# Patient Record
Sex: MALE | Race: WHITE | NOT HISPANIC OR LATINO | Employment: OTHER | URBAN - METROPOLITAN AREA
[De-identification: names, ages, dates, MRNs, and addresses within clinical notes are randomized per-mention and may not be internally consistent; named-entity substitution may affect disease eponyms.]

---

## 2021-08-26 ENCOUNTER — MEDICAL CORRESPONDENCE (OUTPATIENT)
Dept: HEALTH INFORMATION MANAGEMENT | Facility: CLINIC | Age: 66
End: 2021-08-26
Payer: MEDICARE

## 2021-10-23 DIAGNOSIS — Z11.59 ENCOUNTER FOR SCREENING FOR OTHER VIRAL DISEASES: ICD-10-CM

## 2021-11-10 RX ORDER — TRANEXAMIC ACID 650 MG/1
1950 TABLET ORAL ONCE
Status: CANCELLED | OUTPATIENT
Start: 2021-11-10 | End: 2021-11-10

## 2021-11-10 RX ORDER — CELECOXIB 200 MG/1
400 CAPSULE ORAL ONCE
Status: CANCELLED | OUTPATIENT
Start: 2021-11-10 | End: 2021-11-10

## 2021-11-10 RX ORDER — ACETAMINOPHEN 325 MG/1
975 TABLET ORAL ONCE
Status: CANCELLED | OUTPATIENT
Start: 2021-11-10 | End: 2021-11-10

## 2021-11-10 RX ORDER — CEFAZOLIN SODIUM IN 0.9 % NACL 3 G/100 ML
3 INTRAVENOUS SOLUTION, PIGGYBACK (ML) INTRAVENOUS
Status: CANCELLED | OUTPATIENT
Start: 2021-11-10

## 2021-11-10 RX ORDER — CEFAZOLIN SODIUM IN 0.9 % NACL 3 G/100 ML
3 INTRAVENOUS SOLUTION, PIGGYBACK (ML) INTRAVENOUS SEE ADMIN INSTRUCTIONS
Status: CANCELLED | OUTPATIENT
Start: 2021-11-10

## 2021-12-24 ENCOUNTER — TRANSFERRED RECORDS (OUTPATIENT)
Dept: MULTI SPECIALTY CLINIC | Facility: CLINIC | Age: 66
End: 2021-12-24
Payer: MEDICARE

## 2021-12-24 LAB
CREATININE (EXTERNAL): 0.98 MG/DL (ref 0.7–1.3)
GFR ESTIMATED (EXTERNAL): >60 MLS/MIN
GLUCOSE (EXTERNAL): 111 MG/DL (ref 74–100)
HBA1C MFR BLD: 5.9 %
POTASSIUM (EXTERNAL): 4.5 MMOL/L (ref 3.5–5.1)

## 2021-12-29 RX ORDER — DIPHENHYDRAMINE HCL 25 MG
25 TABLET ORAL DAILY PRN
COMMUNITY

## 2021-12-29 RX ORDER — HYDROCODONE BITARTRATE AND ACETAMINOPHEN 7.5; 325 MG/1; MG/1
1-2 TABLET ORAL DAILY PRN
Status: ON HOLD | COMMUNITY
End: 2021-12-30

## 2021-12-29 RX ORDER — SILVER NITRATE 0.1 G/10G
LIQUID TOPICAL DAILY PRN
COMMUNITY

## 2021-12-29 NOTE — PROGRESS NOTES
PTA medications updated by Medication Scribe prior to surgery via phone call with patient (last doses completed by Nurse)     Medication history sources: H&P and (CAREGIVER)  In the past week, patient estimated taking medication this percent of the time: Greater than 90%  Adherence assessment: N/A Not Observed    Significant changes made to the medication list:  None      Additional medication history information:   None    Medication reconciliation completed by provider prior to medication history? No    Time spent in this activity: 35 MINUTES    The information provided in this note is only as accurate as the sources available at the time of update(s)      Prior to Admission medications    Medication Sig Last Dose Taking? Auth Provider   CELLCEPT 500 MG PO TABLET Take 1 tablet (500 mg) by mouth 2 times daily Through 4/14. Starting 4/15, take 2 tabs (1000mg) in the morning and 1 tab (500mg) in the evening for 7 days. Starting 4/22, take 2 tabs (1000mg) in the morning and 2 tabs (1000mg) in the evening.  Patient taking differently: Take 1,500 mg by mouth 2 times daily (500MG X 3 = 1,500MG)  at PM Yes Elida Maynard MD   diphenhydrAMINE (BENADRYL) 25 MG tablet Take 25 mg by mouth daily as needed for allergies  at PRN Yes Reported, Patient   entecavir (BARACLUDE) 0.5 MG tablet Take 1 tablet (0.5 mg) by mouth daily  at AM Yes Elida Maynard MD   FLUoxetine (PROZAC) 20 MG capsule Take 20 mg by mouth daily  at AM Yes Reported, Patient   HYDROcodone-acetaminophen (NORCO) 7.5-325 MG per tablet Take 1-2 tablets by mouth daily as needed for severe pain  at PRN Yes Reported, Patient   silver nitrate 10 % SOLN solution Apply topically daily as needed (BULLOUS PEMPHIOGOID)  at PRN Yes Reported, Patient   blood glucose monitoring (ACCU-CHEK JAY) test strip Use to test blood sugars 4 times daily or as directed.   Elida Maynard MD   blood glucose monitoring (ACCU-CHEK MULTICLIX) lancets Use to test  blood sugar 4 times daily or as directed.   Elida Maynard MD

## 2021-12-30 ENCOUNTER — ANESTHESIA EVENT (OUTPATIENT)
Dept: SURGERY | Facility: CLINIC | Age: 66
End: 2021-12-30
Payer: MEDICARE

## 2021-12-30 ENCOUNTER — APPOINTMENT (OUTPATIENT)
Dept: PHYSICAL THERAPY | Facility: CLINIC | Age: 66
End: 2021-12-30
Attending: ORTHOPAEDIC SURGERY
Payer: MEDICARE

## 2021-12-30 ENCOUNTER — HOSPITAL ENCOUNTER (OUTPATIENT)
Facility: CLINIC | Age: 66
Discharge: HOME | End: 2021-12-31
Attending: ORTHOPAEDIC SURGERY | Admitting: ORTHOPAEDIC SURGERY
Payer: MEDICARE

## 2021-12-30 ENCOUNTER — APPOINTMENT (OUTPATIENT)
Dept: GENERAL RADIOLOGY | Facility: CLINIC | Age: 66
End: 2021-12-30
Attending: PHYSICIAN ASSISTANT
Payer: MEDICARE

## 2021-12-30 ENCOUNTER — ANESTHESIA (OUTPATIENT)
Dept: SURGERY | Facility: CLINIC | Age: 66
End: 2021-12-30
Payer: MEDICARE

## 2021-12-30 DIAGNOSIS — Z98.890 POST-OPERATIVE STATE: Primary | ICD-10-CM

## 2021-12-30 PROBLEM — Z96.659 S/P TOTAL KNEE ARTHROPLASTY: Status: ACTIVE | Noted: 2021-12-30

## 2021-12-30 LAB
FASTING STATUS PATIENT QL REPORTED: YES
GLUCOSE BLD-MCNC: 123 MG/DL (ref 70–99)
GLUCOSE BLDC GLUCOMTR-MCNC: 129 MG/DL (ref 70–99)
GLUCOSE BLDC GLUCOMTR-MCNC: 98 MG/DL (ref 70–99)
POTASSIUM BLD-SCNC: 4.1 MMOL/L (ref 3.4–5.3)

## 2021-12-30 PROCEDURE — 250N000011 HC RX IP 250 OP 636: Performed by: PHYSICIAN ASSISTANT

## 2021-12-30 PROCEDURE — 97110 THERAPEUTIC EXERCISES: CPT | Mod: GP

## 2021-12-30 PROCEDURE — 272N000001 HC OR GENERAL SUPPLY STERILE: Performed by: ORTHOPAEDIC SURGERY

## 2021-12-30 PROCEDURE — 250N000011 HC RX IP 250 OP 636: Performed by: SURGERY

## 2021-12-30 PROCEDURE — 258N000001 HC RX 258: Performed by: ORTHOPAEDIC SURGERY

## 2021-12-30 PROCEDURE — 82962 GLUCOSE BLOOD TEST: CPT

## 2021-12-30 PROCEDURE — 710N000009 HC RECOVERY PHASE 1, LEVEL 1, PER MIN: Performed by: ORTHOPAEDIC SURGERY

## 2021-12-30 PROCEDURE — 99207 PR CDG-CODE CATEGORY CHANGED: CPT | Performed by: PHYSICIAN ASSISTANT

## 2021-12-30 PROCEDURE — 258N000003 HC RX IP 258 OP 636: Performed by: PHYSICIAN ASSISTANT

## 2021-12-30 PROCEDURE — 97116 GAIT TRAINING THERAPY: CPT | Mod: GP

## 2021-12-30 PROCEDURE — 82947 ASSAY GLUCOSE BLOOD QUANT: CPT | Performed by: ANESTHESIOLOGY

## 2021-12-30 PROCEDURE — 250N000013 HC RX MED GY IP 250 OP 250 PS 637: Performed by: PHYSICIAN ASSISTANT

## 2021-12-30 PROCEDURE — 250N000011 HC RX IP 250 OP 636: Performed by: NURSE ANESTHETIST, CERTIFIED REGISTERED

## 2021-12-30 PROCEDURE — 36415 COLL VENOUS BLD VENIPUNCTURE: CPT | Performed by: ANESTHESIOLOGY

## 2021-12-30 PROCEDURE — 999N000141 HC STATISTIC PRE-PROCEDURE NURSING ASSESSMENT: Performed by: ORTHOPAEDIC SURGERY

## 2021-12-30 PROCEDURE — 250N000009 HC RX 250: Performed by: ORTHOPAEDIC SURGERY

## 2021-12-30 PROCEDURE — 999N000063 XR KNEE PORT RIGHT 1/2 VIEWS: Mod: RT

## 2021-12-30 PROCEDURE — 278N000051 HC OR IMPLANT GENERAL: Performed by: ORTHOPAEDIC SURGERY

## 2021-12-30 PROCEDURE — 250N000009 HC RX 250: Performed by: ANESTHESIOLOGY

## 2021-12-30 PROCEDURE — 370N000017 HC ANESTHESIA TECHNICAL FEE, PER MIN: Performed by: ORTHOPAEDIC SURGERY

## 2021-12-30 PROCEDURE — C1776 JOINT DEVICE (IMPLANTABLE): HCPCS | Performed by: ORTHOPAEDIC SURGERY

## 2021-12-30 PROCEDURE — 360N000077 HC SURGERY LEVEL 4, PER MIN: Performed by: ORTHOPAEDIC SURGERY

## 2021-12-30 PROCEDURE — 97161 PT EVAL LOW COMPLEX 20 MIN: CPT | Mod: GP

## 2021-12-30 PROCEDURE — 84132 ASSAY OF SERUM POTASSIUM: CPT | Performed by: ANESTHESIOLOGY

## 2021-12-30 PROCEDURE — 99203 OFFICE O/P NEW LOW 30 MIN: CPT | Performed by: PHYSICIAN ASSISTANT

## 2021-12-30 PROCEDURE — 258N000003 HC RX IP 258 OP 636: Performed by: ANESTHESIOLOGY

## 2021-12-30 PROCEDURE — 97530 THERAPEUTIC ACTIVITIES: CPT | Mod: GP

## 2021-12-30 PROCEDURE — 258N000003 HC RX IP 258 OP 636: Performed by: NURSE ANESTHETIST, CERTIFIED REGISTERED

## 2021-12-30 PROCEDURE — 250N000009 HC RX 250: Performed by: NURSE ANESTHETIST, CERTIFIED REGISTERED

## 2021-12-30 DEVICE — IMPLANTABLE DEVICE: Type: IMPLANTABLE DEVICE | Site: KNEE | Status: FUNCTIONAL

## 2021-12-30 DEVICE — BONE CEMENT SIMPLEX FULL DOSE 6191-1-001: Type: IMPLANTABLE DEVICE | Site: KNEE | Status: FUNCTIONAL

## 2021-12-30 RX ORDER — OXYCODONE HYDROCHLORIDE 5 MG/1
5 TABLET ORAL EVERY 4 HOURS PRN
Status: DISCONTINUED | OUTPATIENT
Start: 2021-12-30 | End: 2021-12-30 | Stop reason: HOSPADM

## 2021-12-30 RX ORDER — ONDANSETRON 4 MG/1
4 TABLET, ORALLY DISINTEGRATING ORAL EVERY 6 HOURS PRN
Status: DISCONTINUED | OUTPATIENT
Start: 2021-12-30 | End: 2021-12-31 | Stop reason: HOSPADM

## 2021-12-30 RX ORDER — ASPIRIN 81 MG/1
162 TABLET ORAL DAILY
Qty: 120 TABLET | Refills: 0 | Status: SHIPPED | OUTPATIENT
Start: 2021-12-30

## 2021-12-30 RX ORDER — NALOXONE HYDROCHLORIDE 0.4 MG/ML
0.2 INJECTION, SOLUTION INTRAMUSCULAR; INTRAVENOUS; SUBCUTANEOUS
Status: DISCONTINUED | OUTPATIENT
Start: 2021-12-30 | End: 2021-12-31 | Stop reason: HOSPADM

## 2021-12-30 RX ORDER — PROPOFOL 10 MG/ML
INJECTION, EMULSION INTRAVENOUS CONTINUOUS PRN
Status: DISCONTINUED | OUTPATIENT
Start: 2021-12-30 | End: 2021-12-30

## 2021-12-30 RX ORDER — DEXAMETHASONE SODIUM PHOSPHATE 4 MG/ML
INJECTION, SOLUTION INTRA-ARTICULAR; INTRALESIONAL; INTRAMUSCULAR; INTRAVENOUS; SOFT TISSUE PRN
Status: DISCONTINUED | OUTPATIENT
Start: 2021-12-30 | End: 2021-12-30

## 2021-12-30 RX ORDER — ONDANSETRON 4 MG/1
4 TABLET, ORALLY DISINTEGRATING ORAL EVERY 30 MIN PRN
Status: DISCONTINUED | OUTPATIENT
Start: 2021-12-30 | End: 2021-12-30 | Stop reason: HOSPADM

## 2021-12-30 RX ORDER — ACETAMINOPHEN 325 MG/1
650 TABLET ORAL EVERY 4 HOURS PRN
Qty: 100 TABLET | Refills: 0 | Status: SHIPPED | OUTPATIENT
Start: 2021-12-30

## 2021-12-30 RX ORDER — NALOXONE HYDROCHLORIDE 0.4 MG/ML
0.4 INJECTION, SOLUTION INTRAMUSCULAR; INTRAVENOUS; SUBCUTANEOUS
Status: DISCONTINUED | OUTPATIENT
Start: 2021-12-30 | End: 2021-12-31 | Stop reason: HOSPADM

## 2021-12-30 RX ORDER — FENTANYL CITRATE 0.05 MG/ML
25 INJECTION, SOLUTION INTRAMUSCULAR; INTRAVENOUS EVERY 5 MIN PRN
Status: DISCONTINUED | OUTPATIENT
Start: 2021-12-30 | End: 2021-12-30 | Stop reason: HOSPADM

## 2021-12-30 RX ORDER — ACETAMINOPHEN 325 MG/1
975 TABLET ORAL EVERY 8 HOURS
Status: DISCONTINUED | OUTPATIENT
Start: 2021-12-30 | End: 2021-12-31 | Stop reason: HOSPADM

## 2021-12-30 RX ORDER — CELECOXIB 100 MG/1
100 CAPSULE ORAL 2 TIMES DAILY
Status: DISCONTINUED | OUTPATIENT
Start: 2021-12-30 | End: 2021-12-31 | Stop reason: HOSPADM

## 2021-12-30 RX ORDER — LIDOCAINE 40 MG/G
CREAM TOPICAL
Status: DISCONTINUED | OUTPATIENT
Start: 2021-12-30 | End: 2021-12-31 | Stop reason: HOSPADM

## 2021-12-30 RX ORDER — DEXTROSE MONOHYDRATE 25 G/50ML
25-50 INJECTION, SOLUTION INTRAVENOUS
Status: DISCONTINUED | OUTPATIENT
Start: 2021-12-30 | End: 2021-12-31 | Stop reason: HOSPADM

## 2021-12-30 RX ORDER — CEFAZOLIN SODIUM IN 0.9 % NACL 3 G/100 ML
3 INTRAVENOUS SOLUTION, PIGGYBACK (ML) INTRAVENOUS SEE ADMIN INSTRUCTIONS
Status: DISCONTINUED | OUTPATIENT
Start: 2021-12-30 | End: 2021-12-30 | Stop reason: HOSPADM

## 2021-12-30 RX ORDER — HYDROMORPHONE HCL IN WATER/PF 6 MG/30 ML
0.4 PATIENT CONTROLLED ANALGESIA SYRINGE INTRAVENOUS
Status: DISCONTINUED | OUTPATIENT
Start: 2021-12-30 | End: 2021-12-31 | Stop reason: HOSPADM

## 2021-12-30 RX ORDER — SODIUM CHLORIDE, SODIUM LACTATE, POTASSIUM CHLORIDE, CALCIUM CHLORIDE 600; 310; 30; 20 MG/100ML; MG/100ML; MG/100ML; MG/100ML
INJECTION, SOLUTION INTRAVENOUS CONTINUOUS
Status: DISCONTINUED | OUTPATIENT
Start: 2021-12-30 | End: 2021-12-31 | Stop reason: HOSPADM

## 2021-12-30 RX ORDER — ENTECAVIR 0.5 MG/1
0.5 TABLET, FILM COATED ORAL DAILY
Status: DISCONTINUED | OUTPATIENT
Start: 2021-12-31 | End: 2021-12-31 | Stop reason: HOSPADM

## 2021-12-30 RX ORDER — VANCOMYCIN HYDROCHLORIDE 1 G/20ML
INJECTION, POWDER, LYOPHILIZED, FOR SOLUTION INTRAVENOUS
Status: DISCONTINUED
Start: 2021-12-30 | End: 2021-12-30 | Stop reason: HOSPADM

## 2021-12-30 RX ORDER — OXYCODONE HYDROCHLORIDE 5 MG/1
5 TABLET ORAL EVERY 4 HOURS PRN
Status: DISCONTINUED | OUTPATIENT
Start: 2021-12-30 | End: 2021-12-31 | Stop reason: HOSPADM

## 2021-12-30 RX ORDER — CELECOXIB 200 MG/1
400 CAPSULE ORAL ONCE
Status: COMPLETED | OUTPATIENT
Start: 2021-12-30 | End: 2021-12-30

## 2021-12-30 RX ORDER — LIDOCAINE HYDROCHLORIDE 20 MG/ML
INJECTION, SOLUTION INFILTRATION; PERINEURAL PRN
Status: DISCONTINUED | OUTPATIENT
Start: 2021-12-30 | End: 2021-12-30

## 2021-12-30 RX ORDER — ASPIRIN 81 MG/1
162 TABLET ORAL DAILY
Status: DISCONTINUED | OUTPATIENT
Start: 2021-12-30 | End: 2021-12-31 | Stop reason: HOSPADM

## 2021-12-30 RX ORDER — CEFAZOLIN SODIUM IN 0.9 % NACL 3 G/100 ML
3 INTRAVENOUS SOLUTION, PIGGYBACK (ML) INTRAVENOUS
Status: DISCONTINUED | OUTPATIENT
Start: 2021-12-30 | End: 2021-12-30 | Stop reason: HOSPADM

## 2021-12-30 RX ORDER — TRANEXAMIC ACID 650 MG/1
1950 TABLET ORAL ONCE
Status: COMPLETED | OUTPATIENT
Start: 2021-12-30 | End: 2021-12-30

## 2021-12-30 RX ORDER — HYDROMORPHONE HCL IN WATER/PF 6 MG/30 ML
0.4 PATIENT CONTROLLED ANALGESIA SYRINGE INTRAVENOUS EVERY 5 MIN PRN
Status: DISCONTINUED | OUTPATIENT
Start: 2021-12-30 | End: 2021-12-30 | Stop reason: HOSPADM

## 2021-12-30 RX ORDER — ACETAMINOPHEN 325 MG/1
975 TABLET ORAL ONCE
Status: COMPLETED | OUTPATIENT
Start: 2021-12-30 | End: 2021-12-30

## 2021-12-30 RX ORDER — BISACODYL 10 MG
10 SUPPOSITORY, RECTAL RECTAL DAILY PRN
Status: DISCONTINUED | OUTPATIENT
Start: 2021-12-30 | End: 2021-12-31 | Stop reason: HOSPADM

## 2021-12-30 RX ORDER — AMOXICILLIN 250 MG
1-2 CAPSULE ORAL 2 TIMES DAILY
Qty: 100 TABLET | Refills: 0 | Status: SHIPPED | OUTPATIENT
Start: 2021-12-30

## 2021-12-30 RX ORDER — NICOTINE POLACRILEX 4 MG
15-30 LOZENGE BUCCAL
Status: DISCONTINUED | OUTPATIENT
Start: 2021-12-30 | End: 2021-12-31 | Stop reason: HOSPADM

## 2021-12-30 RX ORDER — SODIUM CHLORIDE, SODIUM LACTATE, POTASSIUM CHLORIDE, CALCIUM CHLORIDE 600; 310; 30; 20 MG/100ML; MG/100ML; MG/100ML; MG/100ML
INJECTION, SOLUTION INTRAVENOUS CONTINUOUS
Status: DISCONTINUED | OUTPATIENT
Start: 2021-12-30 | End: 2021-12-30 | Stop reason: HOSPADM

## 2021-12-30 RX ORDER — DIPHENHYDRAMINE HCL 12.5MG/5ML
12.5 LIQUID (ML) ORAL EVERY 6 HOURS PRN
Status: DISCONTINUED | OUTPATIENT
Start: 2021-12-30 | End: 2021-12-31 | Stop reason: HOSPADM

## 2021-12-30 RX ORDER — CEFAZOLIN SODIUM 2 G/100ML
2 INJECTION, SOLUTION INTRAVENOUS EVERY 8 HOURS
Status: COMPLETED | OUTPATIENT
Start: 2021-12-30 | End: 2021-12-31

## 2021-12-30 RX ORDER — SILVER NITRATE 0.1 G/10G
LIQUID TOPICAL DAILY PRN
Status: DISCONTINUED | OUTPATIENT
Start: 2021-12-30 | End: 2021-12-30 | Stop reason: RX

## 2021-12-30 RX ORDER — AMOXICILLIN 250 MG
1 CAPSULE ORAL 2 TIMES DAILY
Status: DISCONTINUED | OUTPATIENT
Start: 2021-12-30 | End: 2021-12-31 | Stop reason: HOSPADM

## 2021-12-30 RX ORDER — POLYETHYLENE GLYCOL 3350 17 G/17G
17 POWDER, FOR SOLUTION ORAL DAILY
Status: DISCONTINUED | OUTPATIENT
Start: 2021-12-31 | End: 2021-12-31 | Stop reason: HOSPADM

## 2021-12-30 RX ORDER — OXYCODONE HYDROCHLORIDE 5 MG/1
10 TABLET ORAL EVERY 4 HOURS PRN
Status: DISCONTINUED | OUTPATIENT
Start: 2021-12-30 | End: 2021-12-31 | Stop reason: HOSPADM

## 2021-12-30 RX ORDER — ONDANSETRON 2 MG/ML
4 INJECTION INTRAMUSCULAR; INTRAVENOUS EVERY 6 HOURS PRN
Status: DISCONTINUED | OUTPATIENT
Start: 2021-12-30 | End: 2021-12-31 | Stop reason: HOSPADM

## 2021-12-30 RX ORDER — FENTANYL CITRATE 50 UG/ML
INJECTION, SOLUTION INTRAMUSCULAR; INTRAVENOUS PRN
Status: DISCONTINUED | OUTPATIENT
Start: 2021-12-30 | End: 2021-12-30

## 2021-12-30 RX ORDER — CELECOXIB 200 MG/1
200 CAPSULE ORAL DAILY
Qty: 40 CAPSULE | Refills: 0 | Status: SHIPPED | OUTPATIENT
Start: 2021-12-30 | End: 2022-02-08

## 2021-12-30 RX ORDER — ONDANSETRON 2 MG/ML
INJECTION INTRAMUSCULAR; INTRAVENOUS PRN
Status: DISCONTINUED | OUTPATIENT
Start: 2021-12-30 | End: 2021-12-30

## 2021-12-30 RX ORDER — PROCHLORPERAZINE MALEATE 5 MG
5 TABLET ORAL EVERY 6 HOURS PRN
Status: DISCONTINUED | OUTPATIENT
Start: 2021-12-30 | End: 2021-12-31 | Stop reason: HOSPADM

## 2021-12-30 RX ORDER — MAGNESIUM HYDROXIDE 1200 MG/15ML
LIQUID ORAL PRN
Status: DISCONTINUED | OUTPATIENT
Start: 2021-12-30 | End: 2021-12-30 | Stop reason: HOSPADM

## 2021-12-30 RX ORDER — HYDROMORPHONE HCL IN WATER/PF 6 MG/30 ML
0.2 PATIENT CONTROLLED ANALGESIA SYRINGE INTRAVENOUS
Status: DISCONTINUED | OUTPATIENT
Start: 2021-12-30 | End: 2021-12-31 | Stop reason: HOSPADM

## 2021-12-30 RX ORDER — HYDROXYZINE HYDROCHLORIDE 10 MG/1
10 TABLET, FILM COATED ORAL EVERY 6 HOURS PRN
Status: DISCONTINUED | OUTPATIENT
Start: 2021-12-30 | End: 2021-12-31 | Stop reason: HOSPADM

## 2021-12-30 RX ORDER — OXYCODONE HYDROCHLORIDE 5 MG/1
5-10 TABLET ORAL EVERY 4 HOURS PRN
Qty: 50 TABLET | Refills: 0 | Status: SHIPPED | OUTPATIENT
Start: 2021-12-30

## 2021-12-30 RX ORDER — ONDANSETRON 2 MG/ML
4 INJECTION INTRAMUSCULAR; INTRAVENOUS EVERY 30 MIN PRN
Status: DISCONTINUED | OUTPATIENT
Start: 2021-12-30 | End: 2021-12-30 | Stop reason: HOSPADM

## 2021-12-30 RX ADMIN — PHENYLEPHRINE HYDROCHLORIDE 100 MCG: 10 INJECTION INTRAVENOUS at 07:59

## 2021-12-30 RX ADMIN — SODIUM CHLORIDE, POTASSIUM CHLORIDE, SODIUM LACTATE AND CALCIUM CHLORIDE: 600; 310; 30; 20 INJECTION, SOLUTION INTRAVENOUS at 14:56

## 2021-12-30 RX ADMIN — ACETAMINOPHEN 1000 MG: 500 TABLET, FILM COATED ORAL at 06:03

## 2021-12-30 RX ADMIN — CELECOXIB 400 MG: 200 CAPSULE ORAL at 06:03

## 2021-12-30 RX ADMIN — PROPOFOL 50 MCG/KG/MIN: 10 INJECTION, EMULSION INTRAVENOUS at 07:45

## 2021-12-30 RX ADMIN — PHENYLEPHRINE HYDROCHLORIDE 100 MCG: 10 INJECTION INTRAVENOUS at 08:11

## 2021-12-30 RX ADMIN — TRANEXAMIC ACID 1950 MG: 650 TABLET ORAL at 06:03

## 2021-12-30 RX ADMIN — Medication 3 G: at 07:38

## 2021-12-30 RX ADMIN — PHENYLEPHRINE HYDROCHLORIDE 100 MCG: 10 INJECTION INTRAVENOUS at 08:36

## 2021-12-30 RX ADMIN — PHENYLEPHRINE HYDROCHLORIDE 100 MCG: 10 INJECTION INTRAVENOUS at 08:18

## 2021-12-30 RX ADMIN — ASPIRIN 162 MG: 81 TABLET, COATED ORAL at 17:43

## 2021-12-30 RX ADMIN — PHENYLEPHRINE HYDROCHLORIDE 150 MCG: 10 INJECTION INTRAVENOUS at 08:53

## 2021-12-30 RX ADMIN — FENTANYL CITRATE 50 MCG: 50 INJECTION, SOLUTION INTRAMUSCULAR; INTRAVENOUS at 07:41

## 2021-12-30 RX ADMIN — SODIUM CHLORIDE, POTASSIUM CHLORIDE, SODIUM LACTATE AND CALCIUM CHLORIDE: 600; 310; 30; 20 INJECTION, SOLUTION INTRAVENOUS at 10:58

## 2021-12-30 RX ADMIN — PHENYLEPHRINE HYDROCHLORIDE 100 MCG: 10 INJECTION INTRAVENOUS at 08:02

## 2021-12-30 RX ADMIN — CEFAZOLIN SODIUM 2 G: 2 INJECTION, SOLUTION INTRAVENOUS at 17:43

## 2021-12-30 RX ADMIN — PHENYLEPHRINE HYDROCHLORIDE 100 MCG: 10 INJECTION INTRAVENOUS at 08:42

## 2021-12-30 RX ADMIN — PHENYLEPHRINE HYDROCHLORIDE 100 MCG: 10 INJECTION INTRAVENOUS at 07:55

## 2021-12-30 RX ADMIN — PHENYLEPHRINE HYDROCHLORIDE 100 MCG: 10 INJECTION INTRAVENOUS at 08:22

## 2021-12-30 RX ADMIN — PHENYLEPHRINE HYDROCHLORIDE 100 MCG: 10 INJECTION INTRAVENOUS at 08:27

## 2021-12-30 RX ADMIN — PHENYLEPHRINE HYDROCHLORIDE 100 MCG: 10 INJECTION INTRAVENOUS at 07:48

## 2021-12-30 RX ADMIN — FENTANYL CITRATE 25 MCG: 50 INJECTION, SOLUTION INTRAMUSCULAR; INTRAVENOUS at 07:48

## 2021-12-30 RX ADMIN — LIDOCAINE HYDROCHLORIDE 100 MG: 20 INJECTION, SOLUTION INFILTRATION; PERINEURAL at 07:41

## 2021-12-30 RX ADMIN — PHENYLEPHRINE HYDROCHLORIDE 100 MCG: 10 INJECTION INTRAVENOUS at 08:15

## 2021-12-30 RX ADMIN — CELECOXIB 100 MG: 100 CAPSULE ORAL at 21:46

## 2021-12-30 RX ADMIN — MIDAZOLAM 2 MG: 1 INJECTION INTRAMUSCULAR; INTRAVENOUS at 07:36

## 2021-12-30 RX ADMIN — PHENYLEPHRINE HYDROCHLORIDE 100 MCG: 10 INJECTION INTRAVENOUS at 08:32

## 2021-12-30 RX ADMIN — SENNOSIDES AND DOCUSATE SODIUM 1 TABLET: 50; 8.6 TABLET ORAL at 21:46

## 2021-12-30 RX ADMIN — SODIUM CHLORIDE, POTASSIUM CHLORIDE, SODIUM LACTATE AND CALCIUM CHLORIDE: 600; 310; 30; 20 INJECTION, SOLUTION INTRAVENOUS at 08:48

## 2021-12-30 RX ADMIN — SODIUM CHLORIDE, POTASSIUM CHLORIDE, SODIUM LACTATE AND CALCIUM CHLORIDE: 600; 310; 30; 20 INJECTION, SOLUTION INTRAVENOUS at 06:46

## 2021-12-30 RX ADMIN — FLUOXETINE 20 MG: 20 CAPSULE ORAL at 12:22

## 2021-12-30 RX ADMIN — PHENYLEPHRINE HYDROCHLORIDE 100 MCG: 10 INJECTION INTRAVENOUS at 07:44

## 2021-12-30 RX ADMIN — DEXAMETHASONE SODIUM PHOSPHATE 4 MG: 4 INJECTION, SOLUTION INTRA-ARTICULAR; INTRALESIONAL; INTRAMUSCULAR; INTRAVENOUS; SOFT TISSUE at 07:50

## 2021-12-30 RX ADMIN — PHENYLEPHRINE HYDROCHLORIDE 100 MCG: 10 INJECTION INTRAVENOUS at 08:59

## 2021-12-30 RX ADMIN — PHENYLEPHRINE HYDROCHLORIDE 100 MCG: 10 INJECTION INTRAVENOUS at 08:06

## 2021-12-30 RX ADMIN — ACETAMINOPHEN 975 MG: 325 TABLET, FILM COATED ORAL at 21:46

## 2021-12-30 RX ADMIN — MIDAZOLAM HYDROCHLORIDE 1 MG: 1 INJECTION, SOLUTION INTRAMUSCULAR; INTRAVENOUS at 07:11

## 2021-12-30 RX ADMIN — PHENYLEPHRINE HYDROCHLORIDE 100 MCG: 10 INJECTION INTRAVENOUS at 08:48

## 2021-12-30 RX ADMIN — ONDANSETRON 4 MG: 2 INJECTION INTRAMUSCULAR; INTRAVENOUS at 08:53

## 2021-12-30 RX ADMIN — PHENYLEPHRINE HYDROCHLORIDE 50 MCG: 10 INJECTION INTRAVENOUS at 09:08

## 2021-12-30 RX ADMIN — PHENYLEPHRINE HYDROCHLORIDE 100 MCG: 10 INJECTION INTRAVENOUS at 07:53

## 2021-12-30 RX ADMIN — LIDOCAINE HYDROCHLORIDE 1 ML: 10 INJECTION, SOLUTION EPIDURAL; INFILTRATION; INTRACAUDAL; PERINEURAL at 06:46

## 2021-12-30 RX ADMIN — ACETAMINOPHEN 975 MG: 325 TABLET, FILM COATED ORAL at 14:19

## 2021-12-30 ASSESSMENT — MIFFLIN-ST. JEOR: SCORE: 1922.05

## 2021-12-30 NOTE — ANESTHESIA PREPROCEDURE EVALUATION
Anesthesia Pre-Procedure Evaluation    Patient: Jimy Harris   MRN: [id number] : 1955        Preoperative Diagnosis: Osteoarthritis of right knee [M17.11]    Procedure : Procedure(s):  RIGHT OPEN TOTAL KNEE ARTHROPLASTY          Past Medical History:   Diagnosis Date     Diabetes (H)      Skin disease       History reviewed. No pertinent surgical history.   No Known Allergies   Social History     Tobacco Use     Smoking status: Former Smoker     Packs/day: 2.00     Years: 38.00     Pack years: 76.00     Types: Cigarettes     Quit date: 2010     Years since quittin.0     Smokeless tobacco: Former User     Quit date: 1992   Substance Use Topics     Alcohol use: No     Comment: nothing in the 16 years       Wt Readings from Last 1 Encounters:   21 115.7 kg (255 lb)        Anesthesia Evaluation            ROS/MED HX  ENT/Pulmonary:       Neurologic:       Cardiovascular:       METS/Exercise Tolerance:     Hematologic:       Musculoskeletal: Comment: Psoriatic arthritis  osteopenia  (+) arthritis,     GI/Hepatic: Comment: HBV      Renal/Genitourinary:       Endo:     (+) type II DM, Obesity,     Psychiatric/Substance Use:     (+) psychiatric history depression     Infectious Disease:       Malignancy:       Other:            Physical Exam    Airway        Mallampati: II   TM distance: > 3 FB   Neck ROM: full   Mouth opening: > 3 cm    Respiratory Devices and Support         Dental           Cardiovascular   cardiovascular exam normal          Pulmonary   pulmonary exam normal                OUTSIDE LABS:  CBC:   Lab Results   Component Value Date    WBC 20.5 (H) 2016    WBC 20.3 (H) 2016    HGB 15.5 2016    HGB 15.3 2016    HCT 46.7 2016    HCT 44.4 2016     2016     (L) 2016     BMP:   Lab Results   Component Value Date     04/10/2016     2016    POTASSIUM 4.1 2021    POTASSIUM 4.4 04/10/2016     CHLORIDE 99 04/10/2016    CHLORIDE 98 04/09/2016    CO2 30 04/10/2016    CO2 27 04/09/2016    BUN 22 04/10/2016    BUN 23 04/09/2016    CR 0.70 04/10/2016    CR 0.70 04/09/2016     (H) 12/30/2021     (H) 04/10/2016     COAGS: No results found for: PTT, INR, FIBR  POC:   Lab Results   Component Value Date     (H) 04/13/2016     HEPATIC:   Lab Results   Component Value Date    ALBUMIN 3.3 (L) 04/04/2016    PROTTOTAL 7.3 04/04/2016    ALT 49 04/04/2016    AST 13 04/04/2016    ALKPHOS 54 04/04/2016    BILITOTAL 0.5 04/04/2016     OTHER:   Lab Results   Component Value Date    LACT 2.6 (H) 04/12/2016    CIARA 8.3 (L) 04/10/2016    MAG 2.3 04/09/2016    CRP <2.9 04/09/2016       Anesthesia Plan    ASA Status:  3   NPO Status:  NPO Appropriate    Anesthesia Type: Spinal.              Consents    Anesthesia Plan(s) and associated risks, benefits, and realistic alternatives discussed. Questions answered and patient/representative(s) expressed understanding.    - Discussed:     - Discussed with:  Patient         Postoperative Care    Pain management: IV analgesics, Neuraxial analgesia, Multi-modal analgesia.   PONV prophylaxis: Ondansetron (or other 5HT-3), Dexamethasone or Solumedrol     Comments:                Jerome Lira MD

## 2021-12-30 NOTE — CONSULTS
[facility]  Consult Note - Hospitalist Service     Date of Admission:  12/30/2021  Consult Requested by: Orthopedics  Reason for Consult: Medical    Assessment & Plan   Jimy Harris is a 66 year old male with PMH significant for DM 2, neuropathy, bullous pemphigoid, depression, hepatitis B virus, history of hepatitis C, obesity class II, psoriasis with arthropathy, history of pulmonary embolus, history of prostate cancer who was admitted to [facility] on 12/30/2021 by the orthopedic service for elective total knee arthroplasty with Dr. Santiago Workman.     Asymptomatic COVID-19 admission screen: Negative 12/27/21  - Fully vaccinated with Moderna 4/2021, Full dose 100mcg Moderna 3rd dose 8/17/21 due to immunocompromised state.  - Due for Booster 2/17/21, 6 months after 3rd dose    Osteoarthritis of the right knee s/p total knee arthroplasty 12/30/21  - Routine postoperative cares per primary service, including IVF, pain control, abx, DVT ppx, and disposition  - PT/OT as per primary servicebull  - Aggressive pulmonary toilet, frequent IS use 10x/hour while awake  - Resume PTA aspirin 81 mg daily when okay from surgical perspective  - Discharge med rec completed 12/30*    Bullous pemphigoid  Temporarily stopped PTA mycophenolate mofetil twice daily, for surgery.   - Hold Cellcept (mycophenolate mofetil) and restart when the wound is healed, sutures/staples are removed, and there are no signs of infection (approximately 14 days).   - PRN topical silver nitrate 10% solution, if needed in case of any flare  - Follow up with dermatology as needed, Dr. William Herndon in [facility]    History of Non-Insulin dependent type 2 diabetes, now prediabetic range  PTA Regimen: diet controlled.  Last HbA1c 5.9% 12/24/21  Given 4mg dexamethasone intraoperatively.  - Hypoglycemia protocol  - Preprandial and HS fingerstick checks or Q 4 hours while NPO  - Sliding scale insulin Low as he  was given steroids introperatively, monitor need to increase to Medium dose but with diet controlled DM unlikely to have high insulin requirements  - Consistent carbohydrate diet, 75gm per meal    Recent Labs   Lab 12/30/21  0553   *       Psoriasis with arthropathy: Resume PTA Cosentyx subcutaneous monthly after discharge as recommended by outpatient care team.    Hepatitis B right virus: Continue entecavir daily    Depression: Continue PTA SSRI    History of DVT RLE 2016  No recurrence, off anticoagulation. Unclear if provoked. Unable to find further details, history provided by patient. Noted in preop a pulmonary embolus however no details in chart and patient denies any blood clot in his lungs but only his leg.     Other pertinent history and chronic stable diagnoses: Appropriate PTA medications will be resumed.  History of prostate cancer status post prostatectomy 2010  Osteopenia  Daily headaches  History of alcohol abuse and ascites, in remission x21 years  History of hepatitis C infection, treated  Irritable bowel syndrome  History of venous stasis ulcers  History of diverticulitis  Obese class II, BMI 38: Increases all cause mortality.  Former smoker      Diet: Discharge Instruction - Regular Diet Adult  High Consistent Carb (75 g CHO per Meal) Diet  DVT Prophylaxis: Defer to primary service  De Jesus Catheter: Not present  Code Status: No CPR- Do NOT Intubate    The patient's care was discussed with the Attending Physician, Dr. Olmedo, Bedside Nurse and Patient.    GABRIEL Beltre, PA-C  Hospitalist [facility]  [facility]    ______________________________________________________________________    Chief Complaint   Knee pain    History is obtained from the patient and electronic health record    History of Present Illness   Jimy aHrris is a 66 year old male with PMH significant for DM 2, neuropathy, bullous pemphigoid, depression, hepatitis B virus, history of  hepatitis C, obesity class II, psoriasis with arthropathy, history of pulmonary embolus, history of prostate cancer who was admitted to [facility] on 2021 by the orthopedic service for elective total knee arthroplasty with Dr. Santiago Workman. No immediate postoperative complications, spinal block with abductor canal block, EBL 25 mL. The Hospitalist service was consulted for medical co-management. Preoperative H&P reviewed.    During my visit, the patient is feeling well. No c/o. Pain controlled. Weaned to RA. Demonstrated   IS use.     Review of Systems   The 10 point Review of Systems is negative other than noted in the HPI or here.     Past Medical History    I have reviewed this patient's medical history and updated it with pertinent information if needed.   Past Medical History:   Diagnosis Date     Bullous pemphigoid      Depression      Diabetes (H)      Diverticulitis of colon      Former smoker      Hepatitis B infection      History of alcohol abuse      History of hepatitis C      History of pulmonary embolism      Irritable bowel syndrome      Neuropathy      Obesity, Class II, BMI 35-39.9      Osteoarthritis of right knee, unspecified osteoarthritis type      Osteopenia      Prostate cancer (H)      Psoriasis with arthropathy (H)      Skin disease      Venous stasis        Past Surgical History   I have reviewed this patient's surgical history and updated it with pertinent information if needed.  Past Surgical History:   Procedure Laterality Date     ARTHROSCOPY KNEE Right      COLONOSCOPY  2012     EYE SURGERY       PROSTATECTOMY          Social History   I have reviewed this patient's social history and updated it with pertinent information if needed.  Social History     Tobacco Use     Smoking status: Former Smoker     Packs/day: 2.00     Years: 38.00     Pack years: 76.00     Types: Cigarettes     Quit date: 2010     Years since quittin.0     Smokeless  tobacco: Former User     Quit date: 12/30/1992   Vaping Use     Vaping Use: Never used   Substance Use Topics     Alcohol use: No     Comment: nothing in the 16 years      Drug use: Not Currently     Comment: hx IVDU   History of IV drug use with cessation in the 1980s, contracted hepatitis C from this usage.    Family History   I have reviewed this patient's family history and updated it with pertinent information if needed.   Family History   Problem Relation Age of Onset     Rheumatoid Arthritis Mother      Rheumatoid Arthritis Father      Rheumatoid Arthritis Sister      Cerebrovascular Disease Maternal Grandmother      Diabetes Paternal Grandfather      Melanoma No family hx of      Skin Cancer No family hx of        Medications   I have reviewed this patient's current medications  Medications Prior to Admission   Medication Sig Dispense Refill Last Dose     diphenhydrAMINE (BENADRYL) 25 MG tablet Take 25 mg by mouth daily as needed for allergies   Past Week at PRN     entecavir (BARACLUDE) 0.5 MG tablet Take 1 tablet (0.5 mg) by mouth daily 30 tablet 3 12/23/2021 at AM     FLUoxetine (PROZAC) 20 MG capsule Take 20 mg by mouth daily   12/25/2021 at AM     silver nitrate 10 % SOLN solution Apply topically daily as needed (BULLOUS PEMPHIOGOID)   More than a month at PRN     blood glucose monitoring (ACCU-CHEK JAY) test strip Use to test blood sugars 4 times daily or as directed. 300 each 3      blood glucose monitoring (ACCU-CHEK MULTICLIX) lancets Use to test blood sugar 4 times daily or as directed. 1 Box 11      [DISCONTINUED] CELLCEPT 500 MG PO TABLET Take 1 tablet (500 mg) by mouth 2 times daily Through 4/14. Starting 4/15, take 2 tabs (1000mg) in the morning and 1 tab (500mg) in the evening for 7 days. Starting 4/22, take 2 tabs (1000mg) in the morning and 2 tabs (1000mg) in the evening. (Patient taking differently: Take 1,500 mg by mouth 2 times daily (500MG X 3 = 1,500MG)) 52 tablet 3 Past Week at PM      [DISCONTINUED] HYDROcodone-acetaminophen (NORCO) 7.5-325 MG per tablet Take 1-2 tablets by mouth daily as needed for severe pain   Past Week at PRN       Allergies   No Known Allergies    Physical Exam   Vital Signs: Temp: 98.3  F (36.8  C) Temp src: Axillary BP: 129/86 Pulse: 77   Resp: 18 SpO2: 97 % O2 Device: None (Room air) Oxygen Delivery: 2 LPM  Weight: 255 lbs 0 oz    Physical Exam    General: Awake, alert, older gentleman who appears stated age. Looks comfortable sitting up in bed. No acute distress.  HEENT: Normocephalic, atraumatic. Extraocular movements intact.   Respiratory: Clear to auscultation bilaterally, no rales, wheezing, or rhonchi. Breathing RA comfortably.  Cardiovascular: Regular rate and rhythm, +S1 and S2, no murmur auscultated. Trace peripheral edema to LLE.   Gastrointestinal: Soft, non-tender, non-distended. Bowel sounds present.  Skin: Warm, dry. No obvious rashes or lesions on exposed skin. Dorsalis pedis pulses palpable bilaterally. No obvious areas of bullous pemphigoid flare.  Musculoskeletal: No joint swelling, erythema or tenderness. Moves all extremities equally. RLE ACE wrapped, motor and sensation intact.  Neurologic: AAO x3. Cranial nerves 2-12 grossly intact, normal strength and sensation.  Psychiatric: Appropriate mood and affect. No obvious anxiety or depression.    Data   Results for orders placed or performed during the hospital encounter of 12/30/21 (from the past 24 hour(s))   Potassium   Result Value Ref Range    Potassium 4.1 3.4 - 5.3 mmol/L   Glucose   Result Value Ref Range    Glucose 123 (H) 70 - 99 mg/dL    Patient Fasting > 8hrs? Yes    XR Knee Port Right 1/2 Views    Narrative    XR KNEE PORT RIGHT 1/2 VIEWS 12/30/2021 9:55 AM     HISTORY: Post-Op Total Knee      Impression    IMPRESSION: Right total knee arthroplasty without apparent  complication.    SINGH TIPTON MD         SYSTEM ID:  [id number]

## 2021-12-30 NOTE — PROCEDURES
DATE OF SERVICE: 12/30/2021    SURGEON   KUN SARGENT MD     FIRST ASSISTANT   ARVIND NOGUERA PA-C   Please bill for Mr. Noguera as first assistant as there was no resident available to assist in this case. Assistants were necessary and performed positioning, draping, retraction, suturing and wound dressing tasks throughout the surgical procedure. The assistant was present for the entire procedure.    ASSISTANTS  RANJAN Cunningham     PREOPERATIVE DIAGNOSIS   Endstage right knee tricompartmental osteoarthritis.     POSTOPERATIVE DIAGNOSIS   Endstage right knee tricompartmental osteoarthritis.     PROCEDURE   Right  total knee arthroplasty using [employer] components, a size 6 Legion posterior stabilized femur, a size 8 tibia, a size 41 mm oval patella, and a size 12 posterior stabilized polyethylene insert.     INDICATIONS FOR SURGERY   Jimy Harris [id number] is a 66 year old-year-old male with a long history of right knee pain. The patient had failed all of the conservative and reasonable options. After discussing with the patient, it was decided that they would benefit from the above-mentioned procedure. Informed consent was obtained.     ANESTHESIA   Spinal block with an abductor canal block.     FINDINGS   There was tricompartmental osteoarthritis. Ligaments were intact otherwise.     DESCRIPTION OF PROCEDURE   Jimy Harris was correctly identified by myself in the PAR and their right lower extremity was marked. A single-shot adductor canal block was placed. The patient was then taken to the operating room where a spinal anesthetic was placed. The patient was placed supine. A tourniquet was placed around the right proximal thigh. A bump was placed underneath the right hip. The patient was then prepped with Avagard, followed by a 10 minute Betadine scrub, alcohol paint, DuraPrep and then draped in the usual fashion. A timeout was performed. The tourniquet was then inflated to 300 mmHg. The  incision was made just medial to the patella for the mini sub-vastus approach after injection with the anesthetic solution, and carried down with sharp dissection. The vastus medialis was then released from its bed and swept laterally. The fat pad excision was performed at this time, as well as a medial release. The knee was then brought into flexion. The anterior horns of the medial and lateral menisci were excised, as well as the ACL and PCL. A PCL retractor was placed. The external tibial cutting guide was placed at 0 degrees slope. This was pinned with a planned 2 mm cut off the medial side and the cut was made with an oscillating saw. This was sized to a(n) 8, pinned in place, and we had excellent alignment with an alignment jordyn. The tibia was reamed and punched in the usual fashion and the trial tibial component was removed. Attention was next turned to the femur. An intramedullary hole was drilled and we placed the distal cutting guide at 5 degrees of valgus. The block was pinned into place and the distal cut was made in the usual fashion. We then placed the AP sizing guide and it was felt that a size 6 would give us the best fit. This was placed in 3 degrees of external rotation which matched up nicely with [facility] [place] [employer]'s line as well as the epicondylar access. The 4-in-1 cutting block was pinned into place. The anterior, posterior and chamfer cuts were then made in the usual fashion. The trial femoral component was placed and the box cut was made with the reamer and box chisel in the usual fashion. At this point, we trialed the knee. We placed a 12 mm spacer. We had excellent balance throughout. The tibial trial component was removed after it was marked in proper alignment. The patella was then clamped with 2 towel clips. An oscillating saw was used to make a flat cut. The patella was sized to a oval 41 mm patella, the lug holes were drilled and then undermined in the usual fashion with a curette. The trial  patella was placed. It fit nicely and sat down completely. All trial components were removed at this point. All bony surfaces were drilled with a small 3 mm drill bit and then thoroughly irrigated and dried. The rest of the local anesthetic was injected into the posterior capsule. Simplex cement mixed with tobramycin was then placed on the tibia and tibial component. The tibial component was impacted in place and the excess cement was removed. Next, cement was placed on the femoral component and on the femur and the femoral component was impacted into place. The excess cement was removed. A size 12 spacer was placed and the knee fully extended. The patella was then irrigated, dried and cement was placed onto the patella and patellar component. The patellar component was clamped in place and the excess cement removed. The knee was then lavaged with a dilute Betadine solution for 3 minutes and then irrigated again with normal saline to remove all the Betadine. The permanent 12 mm spacer was inserted with the  tool. The patella was relocated, a gram of vancomycin powder was placed in the joint, the tourniquet was deflated, and then the retinaculum was closed with a running #2 Quill suture. The subdermal layer was closed with a 0 Stratafix running suture, subdermal layer closed with a 2-0 Stratafix running suture and the skin closed with 3-0 Quill. Steri-Strips, as well as sterile dressings, an ACE bandage and ice pack were placed. There were no complications. Sponge counts correct. Tourniquet time 40 minutes. The patient was taken to PAR in stable condition.     DRAINS  None    SPECIMENS  None    COMPLICATIONS  None    ESTIMATED BLOOD LOSS  25 mL    CONDITION ON DISCHARGE FROM OR  Satisfactory    PLAN  1. Antibiotic Prophylaxis was given included Ancef 2 gm. Antibiotics given within 1 hour of surgical incision and discontinued within 24 hrs.   2. DVT prophylaxis ASA given within 24 hours    3. Weight Bearing  WBAT (Weight bearing as tolerated).   4. Discharge anticipated date POD #1 to Home    5. Pain Medication oxycodone, Tylenol and Celebrex  6. Change dressing on POD #1. Discharge with AG dressing.    KUN SARGENT MD      @C(1)@ [facility] - [phone]

## 2021-12-30 NOTE — ANESTHESIA CARE TRANSFER NOTE
Patient: Jimy Harris    Procedure: Procedure(s):  RIGHT OPEN TOTAL KNEE ARTHROPLASTY       Diagnosis: Osteoarthritis of right knee [M17.11]  Diagnosis Additional Information: No value filed.    Anesthesia Type:   Spinal     Note:    Oropharynx: oropharynx clear of all foreign objects and spontaneously breathing  Level of Consciousness: awake  Oxygen Supplementation: face mask  Level of Supplemental Oxygen (L/min / FiO2): 3  LNC  Independent Airway: airway patency satisfactory and stable  Dentition: dentition unchanged  Vital Signs Stable: post-procedure vital signs reviewed and stable  Report to RN Given: handoff report given  Patient transferred to: PACU    Handoff Report: Identifed the Patient, Identified the Reponsible Provider, Reviewed the pertinent medical history, Discussed the surgical course, Reviewed Intra-OP anesthesia mangement and issues during anesthesia, Set expectations for post-procedure period and Allowed opportunity for questions and acknowledgement of understanding      Vitals:  Vitals Value Taken Time   BP 98/67 12/30/21 0920   Temp     Pulse 80 12/30/21 0923   Resp 15 12/30/21 0923   SpO2 100 % 12/30/21 0923   Vitals shown include unvalidated device data.    Electronically Signed By: TROY Valle CRNA  December 30, 2021  9:24 AM

## 2021-12-30 NOTE — PROGRESS NOTES
SPIRITUAL HEALTH SERVICES  SPIRITUAL ASSESSMENT Progress Note  [facility] [address] 23 Ortho    REFERRAL SOURCE: Post-surg pt request    Introductory visit with Jimy, who welcomed my visit today. Jimy shared some of his spiritual journey with me, which includes his long time participation in AA (21 years in recovery) and 6-7 years connected with his Orthodox in [place], [employer]. Scripture study is a regular spiritual practice that Jimy finds meaningful.    Jimy has a strong relationship with his pastors, and is also well supported by family local here in the [facility], who he will be staying with for a few weeks as he recuperates from surgery. We closed our visit with a prayer for Jimy's health.    PLAN: Spiritual Health Services remains available for patient, family, and staff support.     Please page the on call  by placing a STAT or ASAP Epic referral for Spiritual Health (which will roll to the on call pager).        SHERYL Ramirez.   Associate   Pager [phone]

## 2021-12-30 NOTE — ANESTHESIA POSTPROCEDURE EVALUATION
Patient: Jimy Harris    Procedure: Procedure(s):  RIGHT OPEN TOTAL KNEE ARTHROPLASTY       Diagnosis:Osteoarthritis of right knee [M17.11]  Diagnosis Additional Information: No value filed.    Anesthesia Type:  Spinal    Note:  Disposition: Inpatient   Postop Pain Control: Uneventful            Sign Out: Well controlled pain   PONV: No   Neuro/Psych: Uneventful            Sign Out: Acceptable/Baseline neuro status   Airway/Respiratory: Uneventful            Sign Out: Acceptable/Baseline resp. status   CV/Hemodynamics: Uneventful            Sign Out: Acceptable CV status   Other NRE: NONE   DID A NON-ROUTINE EVENT OCCUR? No    Event details/Postop Comments:  Recovery for SAB/RA not complete but expected to resolve within 48 hours.           Last vitals:  Vitals Value Taken Time   /76 12/30/21 1015   Temp 36.2  C (97.1  F) 12/30/21 1000   Pulse 75 12/30/21 1018   Resp 13 12/30/21 1018   SpO2 97 % 12/30/21 1018   Vitals shown include unvalidated device data.    Electronically Signed By: Jerome Lira MD  December 30, 2021  12:07 PM

## 2021-12-31 ENCOUNTER — APPOINTMENT (OUTPATIENT)
Dept: OCCUPATIONAL THERAPY | Facility: CLINIC | Age: 66
End: 2021-12-31
Attending: ORTHOPAEDIC SURGERY
Payer: MEDICARE

## 2021-12-31 ENCOUNTER — APPOINTMENT (OUTPATIENT)
Dept: PHYSICAL THERAPY | Facility: CLINIC | Age: 66
End: 2021-12-31
Attending: ORTHOPAEDIC SURGERY
Payer: MEDICARE

## 2021-12-31 VITALS
RESPIRATION RATE: 17 BRPM | WEIGHT: 255 LBS | DIASTOLIC BLOOD PRESSURE: 74 MMHG | HEART RATE: 77 BPM | BODY MASS INDEX: 37.77 KG/M2 | OXYGEN SATURATION: 99 % | HEIGHT: 69 IN | SYSTOLIC BLOOD PRESSURE: 121 MMHG | TEMPERATURE: 98.2 F

## 2021-12-31 LAB
FASTING STATUS PATIENT QL REPORTED: ABNORMAL
GLUCOSE BLD-MCNC: 104 MG/DL (ref 70–99)
GLUCOSE BLDC GLUCOMTR-MCNC: 131 MG/DL (ref 70–99)
GLUCOSE BLDC GLUCOMTR-MCNC: 88 MG/DL (ref 70–99)
HGB BLD-MCNC: 13.4 G/DL (ref 13.3–17.7)

## 2021-12-31 PROCEDURE — 36415 COLL VENOUS BLD VENIPUNCTURE: CPT | Performed by: PHYSICIAN ASSISTANT

## 2021-12-31 PROCEDURE — 97535 SELF CARE MNGMENT TRAINING: CPT | Mod: GO

## 2021-12-31 PROCEDURE — 97530 THERAPEUTIC ACTIVITIES: CPT | Mod: GP | Performed by: PHYSICAL THERAPIST

## 2021-12-31 PROCEDURE — 250N000013 HC RX MED GY IP 250 OP 250 PS 637: Performed by: PHYSICIAN ASSISTANT

## 2021-12-31 PROCEDURE — 99207 PR CDG-CODE CATEGORY CHANGED: CPT | Performed by: PHYSICIAN ASSISTANT

## 2021-12-31 PROCEDURE — 97116 GAIT TRAINING THERAPY: CPT | Mod: GP | Performed by: PHYSICAL THERAPIST

## 2021-12-31 PROCEDURE — 97110 THERAPEUTIC EXERCISES: CPT | Mod: GP | Performed by: PHYSICAL THERAPIST

## 2021-12-31 PROCEDURE — 99213 OFFICE O/P EST LOW 20 MIN: CPT | Performed by: PHYSICIAN ASSISTANT

## 2021-12-31 PROCEDURE — 250N000011 HC RX IP 250 OP 636: Performed by: PHYSICIAN ASSISTANT

## 2021-12-31 PROCEDURE — 97165 OT EVAL LOW COMPLEX 30 MIN: CPT | Mod: GO

## 2021-12-31 PROCEDURE — 82947 ASSAY GLUCOSE BLOOD QUANT: CPT | Performed by: ORTHOPAEDIC SURGERY

## 2021-12-31 PROCEDURE — 85018 HEMOGLOBIN: CPT | Performed by: PHYSICIAN ASSISTANT

## 2021-12-31 PROCEDURE — 82962 GLUCOSE BLOOD TEST: CPT | Mod: 91

## 2021-12-31 RX ADMIN — ASPIRIN 162 MG: 81 TABLET, COATED ORAL at 08:16

## 2021-12-31 RX ADMIN — FLUOXETINE 20 MG: 20 CAPSULE ORAL at 08:17

## 2021-12-31 RX ADMIN — POLYETHYLENE GLYCOL 3350 17 G: 17 POWDER, FOR SOLUTION ORAL at 08:16

## 2021-12-31 RX ADMIN — CELECOXIB 100 MG: 100 CAPSULE ORAL at 08:16

## 2021-12-31 RX ADMIN — CEFAZOLIN SODIUM 2 G: 2 INJECTION, SOLUTION INTRAVENOUS at 00:32

## 2021-12-31 RX ADMIN — ENTECAVIR 0.5 MG: 0.5 TABLET ORAL at 08:17

## 2021-12-31 RX ADMIN — ACETAMINOPHEN 975 MG: 325 TABLET, FILM COATED ORAL at 06:30

## 2021-12-31 RX ADMIN — OXYCODONE HYDROCHLORIDE 5 MG: 5 TABLET ORAL at 10:35

## 2021-12-31 NOTE — PLAN OF CARE
Patient vital signs are at baseline: Yes  Patient able to ambulate as they were prior to admission or with assist devices provided by therapies during their stay:  Yes  Patient MUST void prior to discharge:  Yes  Patient able to tolerate oral intake:  Yes  Pain has adequate pain control using Oral analgesics:  Yes    Pt received discharge instructions and medications.  Pt did teachback with this writer on the instructions.  Pt has met all his goals.

## 2021-12-31 NOTE — PROGRESS NOTES
Orthopedic Surgery  Jimy Harris  2021  Admit Date:  2021  POD 1 Day Post-Op  S/P Procedure(s):  RIGHT OPEN TOTAL KNEE ARTHROPLASTY    Patient feels good.  Discharge recommendations reviewed.  Pain controlled.  Tolerating oral intake.  No events overnight.     Alert and orient to person, place, and time.  Vital Sign Ranges  Temperature Temp  Av.8  F (36.6  C)  Min: 96.8  F (36  C)  Max: 98.6  F (37  C)   Blood pressure Systolic (24hrs), Av , Min:98 , Max:132        Diastolic (24hrs), Av, Min:62, Max:86      Pulse Pulse  Av.1  Min: 58  Max: 80   Respirations Resp  Av.9  Min: 10  Max: 19   Pulse oximetry SpO2  Av.2 %  Min: 94 %  Max: 100 %       Right knee dressing is clean, dry, and intact. Minimal erythema of the surrounding skin.  Bilateral calves are soft, non-tender.  right lower extremity is NVI.    Labs:  Recent Labs   Lab Test 21  0553 04/10/16  0816 16  0809   POTASSIUM 4.1 4.4 4.7     Recent Labs   Lab Test 21  0752 16  0733 16  0854   HGB 13.4 15.5 15.3     No results for input(s): INR in the last 71973 hours.  Recent Labs   Lab Test 16  0733 16  0854 16  0825    149* 135*       A/P  1. S/p right TKA   Continue  Aspirin 162 qd for DVT prophylaxis.     Mobilize with PT/OT WBAT.     Continue current pain regimen  2. Disposition   Anticipate d/c to to home today.    Kjerstin L Foss, PA-C

## 2021-12-31 NOTE — PLAN OF CARE
Occupational Therapy Discharge Summary    Reason for therapy discharge:    All goals and outcomes met, no further needs identified.    Progress towards therapy goal(s). See goals on Care Plan in [facility] electronic health record for goal details.  Goals met    Therapy recommendation(s):    No further therapy is recommended.

## 2021-12-31 NOTE — PROGRESS NOTES
Patient vital signs are at baseline: Yes  Patient able to ambulate as they were prior to admission or with assist devices provided by therapies during their stay:  Yes  Patient MUST void prior to discharge:  Yes  Patient able to tolerate oral intake:  Yes  Pain has adequate pain control using Oral analgesics:  Yes    AxOx4, vitally stable, no pain, dressing CDI, neurovascularly intact bilaterally.

## 2021-12-31 NOTE — PROGRESS NOTES
12/30/21 1600   Quick Adds   Type of Visit Initial PT Evaluation   Living Environment   People in home other (see comments)  (lives with caregiver 24/7)   Current Living Arrangements house   Home Accessibility no concerns   Number of Stairs, Main Entrance 1   Stair Railings, Main Entrance railing on left side (ascending)   Living Environment Comments going to live with sister, one stair to enter single story house with bed/bath on main level, sister will stay 24/7   Self-Care   Usual Activity Tolerance moderate   Current Activity Tolerance fair   Equipment Currently Used at Home none   Activity/Exercise/Self-Care Comment IND with all mobility and cares before surgery    Disability/Function   Fall history within last six months yes   Number of times patient has fallen within last six months 2   Change in Functional Status Since Onset of Current Illness/Injury yes   General Information   Onset of Illness/Injury or Date of Surgery 12/30/21   Referring Physician Cruz Noguera, DUSTIN   Patient/Family Therapy Goals Statement (PT) return home to sister's house   Pertinent History of Current Problem (include personal factors and/or comorbidities that impact the POC) Pt is a 66 y.o. male s/p R TKA on 12/30/21, POD#0   Existing Precautions/Restrictions fall   Weight-Bearing Status - RLE weight-bearing as tolerated   Cognition   Orientation Status (Cognition) oriented x 3   Affect/Mental Status (Cognition) WNL   Follows Commands (Cognition) WNL   Pain Assessment   Patient Currently in Pain No   Posture    Posture Not impaired   Range of Motion (ROM)   ROM Comment RLE impaired secondary to surgery, WNL otherwise   Strength   Manual Muscle Testing Quick Adds Able to perform R SLR;Able to perform L SLR   Strength Comments grossly antigravity, impairments RLE secondary to surgery   Bed Mobility   Comment (Bed Mobility) supine<>sit Mod-I    Transfers   Transfer Safety Comments sit<>stand with FWW and CGA    Gait/Stairs  (Locomotion)   [place] Level (Gait) contact guard   Assistive Device (Gait) walker, front-wheeled   Distance in Feet (Required for LE Total Joints) 10' + 100'    Comment (Gait/Stairs) gait with FWW and CGA   Balance   Balance Comments balance deficits secondary to right TKA    Sensory Examination   Sensory Perception Comments pt states mild tingling in R foot   Clinical Impression   Criteria for Skilled Therapeutic Intervention yes, treatment indicated   PT Diagnosis (PT) impaired gait   Influenced by the following impairments pain, decrease in functional ROM, strength, and balance   Functional limitations due to impairments fall risk, reduced independence in all functional mobility and ADL's IADL's    Clinical Presentation Stable/Uncomplicated   Clinical Presentation Rationale PMH and clincal judgement   Clinical Decision Making (Complexity) low complexity   Therapy Frequency (PT) 2x/day   Predicted Duration of Therapy Intervention (days/wks) 2 days   Planned Therapy Interventions (PT) balance training;cryotherapy;gait training;patient/family education;ROM (range of motion);stair training;strengthening;stretching;transfer training;progressive activity/exercise   Anticipated Equipment Needs at Discharge (PT) walker, rolling   Risk & Benefits of therapy have been explained care plan/treatment goals reviewed;evaluation/treatment results reviewed;risks/benefits reviewed;current/potential barriers reviewed;participants voiced agreement with care plan;participants included;patient   PT Discharge Planning    PT Rationale for DC Rec Pt currently below baseline but is moving well. Anticipate at time of DC pt will be SBA for transfers and ambulation with FWW, Mark with stairs. Pt plans to go home to sister's house upon discharge where he will have 24/7 assist and help with all needs.    PT Brief overview of current status  bed mobility: mod-I, transfers and gait with FWW: CGA    Total Evaluation Time   Total Evaluation  Time (Minutes) 10

## 2021-12-31 NOTE — PROGRESS NOTES
12/31/21 0735   Quick Adds   Type of Visit Initial Occupational Therapy Evaluation   Living Environment   People in home other (see comments)  (lives with caregiver 24/7)   Current Living Arrangements house   Home Accessibility no concerns   Number of Stairs, Main Entrance 1   Transportation Anticipated car, drives self;family or friend will provide   Living Environment Comments going to live with sister until first follow up appointment. Pt lives up [place]. Bed/bath on main level at sisters house.    Self-Care   Usual Activity Tolerance moderate   Current Activity Tolerance fair   Equipment Currently Used at Home none   Activity/Exercise/Self-Care Comment Indep with all functional mobility and self-cares. Caregiver assists with cooking and does dishes/cleaning.    Disability/Function   Fall history within last six months yes   Number of times patient has fallen within last six months 2   Change in Functional Status Since Onset of Current Illness/Injury yes   General Information   Onset of Illness/Injury or Date of Surgery 12/30/21   Referring Physician Dr. Workman   Additional Occupational Profile Info/Pertinent History of Current Problem Pt is a 66 y.o. male s/p R TKA on 12/30/21, POD#1   Existing Precautions/Restrictions no known precautions/restrictions   Right Lower Extremity (Weight-bearing Status) weight-bearing as tolerated (WBAT)   Cognitive Status Examination   Orientation Status orientation to person, place and time   Follows Commands follows two-step commands;over 90% accuracy   Sensory   Sensory Quick Adds No deficits were identified   Pain Assessment   Patient Currently in Pain Yes, see Vital Sign flowsheet   Integumentary/Edema   Integumentary/Edema other (describe)   Integumentary/Edema Comments ACE wrap around R LE   Posture   Posture protracted shoulders   Range of Motion Comprehensive   General Range of Motion bilateral upper extremity ROM WNL   Strength Comprehensive (MMT)   General Manual Muscle  Testing (MMT) Assessment no strength deficits identified   Bed Mobility   Bed Mobility supine-sit   Supine-Sit [place] (Bed Mobility) supervision   Transfers   Transfers sit-stand transfer;bed-chair transfer   Transfer Comments SBA with FWW   Activities of Daily Living   BADL Assessment lower body dressing;grooming   Lower Body Dressing Assessment   [place] Level (Lower Body Dressing) supervision;set up   Grooming Assessment   [place] Level (Grooming) modified independence   Clinical Impression   Criteria for Skilled Therapeutic Interventions Met (OT) yes;meets criteria;skilled treatment is necessary   OT Diagnosis impaired functional mobility and self cares   OT Problem List-Impairments impacting ADL problems related to;activity tolerance impaired;mobility   Assessment of Occupational Performance 1-3 Performance Deficits   Identified Performance Deficits dressing, functional mobility   Planned Therapy Interventions (OT) ADL retraining;transfer training;progressive activity/exercise   Clinical Decision Making Complexity (OT) low complexity   Therapy Frequency (OT) 1x eval and treat   Predicted Duration of Therapy 1   Risk & Benefits of therapy have been explained evaluation/treatment results reviewed;care plan/treatment goals reviewed   OT Discharge Planning    OT Rationale for DC Rec Pt near baseline with funcitonal mobility and self-cares. Pt moving well and requires SBA at this time for mobility. Completes dressing indep with a few vcs for technqiue. Pt states he will stay with his sister for a few weeks to have assist with IADLs as needed.    Therapy Certification   Start of Care Date 12/30/21   Certification date from 12/30/21   Certification date to 12/31/21   Medical Diagnosis impaired ADLs and functional mobility   Total Evaluation Time (Minutes)   Total Evaluation Time (Minutes) 10

## 2021-12-31 NOTE — PROGRESS NOTES
Patient vital signs are at baseline: Yes  Patient able to ambulate as they were prior to admission or with assist devices provided by therapies during their stay:  No,  Reason:  Only dangled at bedside, need to walk with PT  Patient MUST void prior to discharge:  Yes  Patient able to tolerate oral intake:  Yes  Pain has adequate pain control using Oral analgesics:  Yes

## 2021-12-31 NOTE — PLAN OF CARE
R open T knee surgery.     DNR/DNI, VS WNL, no SOB, Assist x1, got up to walk to bathroom few times during night.   Saline locked, discontinued LR after antibiotics were done. Patient is diabetic, scheduled SB checks and on high carb diet. Typically wont receive anything unless its above 200 BS. Pain is well controlled, denies pain and doesn't use anything, unless he says. Hgb check at 6am.  Discontinued IV fusion of LR as there were no more antibiotics for the night. On Saline lock.

## 2021-12-31 NOTE — PROGRESS NOTES
.Patient vital signs are at baseline: Yes  Patient able to ambulate as they were prior to admission or with assist devices provided by therapies during their stay:  Yes  Patient MUST void prior to discharge:  yes  Patient able to tolerate oral intake:yes  Pain has adequate pain control using Oral analgesics:  yes

## 2021-12-31 NOTE — PROGRESS NOTES
[facility]    Medicine Progress Note - Hospitalist Service       Date of Admission:  12/30/2021  Assessment & Plan   Jimy Harris is a 66 year old male with PMH significant for DM 2, neuropathy, bullous pemphigoid, depression, hepatitis B virus, history of hepatitis C, obesity class II, psoriasis with arthropathy, history of pulmonary embolus, history of prostate cancer who was admitted to [facility] on 12/30/2021 by the orthopedic service for elective total knee arthroplasty with Dr. Santiago Workman.      Asymptomatic COVID-19 admission screen: Negative 12/27/21  - Fully vaccinated with Moderna 4/2021, Full dose 100mcg Moderna 3rd dose 8/17/21 due to immunocompromised state.  - Due for Booster 2/17/21, 6 months after 3rd dose     Osteoarthritis of the right knee s/p total knee arthroplasty 12/30/21  - Routine postoperative cares per primary service, including IVF, pain control, abx, DVT ppx, and disposition  - PT/OT as per primary service  - Aggressive pulmonary toilet, frequent IS use 10x/hour while awake  - Discharge med rec completed, ok for discharge from Hospitalist perspective     Bullous pemphigoid  Temporarily stopped PTA mycophenolate mofetil twice daily, for surgery.   - Hold Cellcept (mycophenolate mofetil) and restart when the wound is healed, sutures/staples are removed, and there are no signs of infection (approximately 14 days). Discussed with patient who verbalized understanding.  - PRN topical silver nitrate 10% solution, if needed in case of any flare  - Follow up with dermatology as needed, Dr. William Herndon in [facility]     History of Non-Insulin dependent type 2 diabetes, now prediabetic range  PTA Regimen: diet controlled.  Last HbA1c 5.9% 12/24/21  Given 4mg dexamethasone intraoperatively.  - Hypoglycemia protocol  - Preprandial and HS fingerstick checks or Q 4 hours while NPO  - Sliding scale insulin Low as he was given steroids  introperatively, monitor need to increase to Medium dose but with diet controlled DM unlikely to have high insulin requirements  - Consistent carbohydrate diet, 75gm per meal     Recent Labs   Lab 12/31/21  0752 12/31/21  0727 12/31/21  0205 12/30/21  2225 12/30/21  1704 12/30/21  0553   * 88 131* 98 129* 123*        Psoriasis with arthropathy: Resume PTA Cosentyx subcutaneous monthly after discharge as recommended by outpatient care team.    Aortic stenosis, moderate  History of murmur since age of 39 per patient. ECHO 2018 in care everywhere LVEF 60% with moderate aortic stenosis, mitral sclerosis without mitral stenosis, and moderate diastolic dysfunction.  - Recommend follow up ECHO in 2022 with PCP to ensure aortic stenosis is stable. Written on discharge instructions for patient.     Hepatitis B right virus: Continue entecavir daily     Depression: Continue PTA SSRI     History of DVT RLE 2016  No recurrence, off anticoagulation. Unclear if provoked. Unable to find further details, history provided by patient. Noted in preop a pulmonary embolus however no details in chart and patient denies any blood clot in his lungs but only his leg.      Other pertinent history and chronic stable diagnoses: Appropriate PTA medications will be resumed.  History of prostate cancer status post prostatectomy 2010  Osteopenia  Daily headaches  History of alcohol abuse and ascites, in remission x21 years  History of hepatitis C infection, treated  Irritable bowel syndrome  History of venous stasis ulcers  History of diverticulitis  Obese class II, BMI 38: Increases all cause mortality.  Former smoker      Diet: Discharge Instruction - Regular Diet Adult  High Consistent Carb (75 g CHO per Meal) Diet    DVT Prophylaxis: Defer to primary service  De Jesus Catheter: Not present  Code Status: No CPR- Do NOT Intubate      Disposition Plan   Expected Discharge: 12/31/2021     Anticipated discharge location:  Awaiting care  coordination huddle  Delays: None    Entered: Malinda Sheppard PA-C 12/31/2021, 12:40 PM       The patient's care was discussed with the Attending Physician, Dr. Sanchez, Bedside Nurse and Patient.    GABRIEL Beltre PA-C  Hospitalist M[facility]  [facility]  Securely message with the Vocera Web Console (learn more here)  Text page via [facility] Paging/Directory  ______________________________________________________________________    Interval History   Seen and examined. Feeling well. Pain controlled. To discharge today and stay with sister. Sensation and motor intact. Discussed monitoring for any flare of bullous pemphigoid, holding cellcept until wound healed. Patient has silver nitrate topical if needed and knows to call dermatology if necessary.    Data reviewed today: I reviewed all medications, new labs and imaging results over the last 24 hours. I personally reviewed no images or EKG's today.    Physical Exam   Vital Signs: Temp: 98.2  F (36.8  C) Temp src: Oral BP: 121/74 Pulse: 77   Resp: 17 SpO2: 99 % O2 Device: None (Room air)    Weight: 255 lbs 0 oz    Physical Exam    General: Awake, alert, older gentleman who appears stated age. Looks comfortable sitting up in bed. No acute distress.  HEENT: Normocephalic, atraumatic. Extraocular movements intact.   Respiratory: Clear to auscultation bilaterally, no rales, wheezing, or rhonchi.  Cardiovascular: Regular rate and rhythm, +S1 and S2, holosystolic murmur auscultated, loudest at RUSB. Trace peripheral edema.   Gastrointestinal: Soft, non-tender, non-distended. Bowel sounds present.  Skin: Warm, dry. No bulla. Discolorations noted scattered to BLE.  Musculoskeletal: No joint swelling, erythema or tenderness. Moves all extremities equally. RLE ace wrapped.  Neurologic: AAO x3. Cranial nerves 2-12 grossly intact, normal strength and sensation.  Psychiatric: Appropriate mood and affect. No obvious anxiety or depression.    Data    Recent Labs   Lab 12/31/21  0752 12/31/21  0727 12/31/21  0205 12/30/21  1704 12/30/21  0553   HGB 13.4  --   --   --   --    POTASSIUM  --   --   --   --  4.1   * 88 131*   < > 123*    < > = values in this interval not displayed.     No results found for this or any previous visit (from the past 24 hour(s)).  Medications     lactated ringers 100 mL/hr at 12/30/21 1456       acetaminophen  975 mg Oral Q8H     aspirin  162 mg Oral Daily     celecoxib  100 mg Oral BID     entecavir  0.5 mg Oral Daily     FLUoxetine  20 mg Oral Daily     insulin aspart  1-3 Units Subcutaneous TID AC     insulin aspart  1-3 Units Subcutaneous At Bedtime     polyethylene glycol  17 g Oral Daily     senna-docusate  1 tablet Oral BID     sodium chloride (PF)  3 mL Intracatheter Q8H

## (undated) DEVICE — SYR 30ML LL W/O NDL 302832

## (undated) DEVICE — SOLUTION WOUND CLEANSING 3/4OZ 10% PVP EA-L3011FB-50

## (undated) DEVICE — WRAP EZY KNEE

## (undated) DEVICE — GLOVE PROTEXIS W/NEU-THERA 8.0  2D73TE80

## (undated) DEVICE — DRSG STERI STRIP 1/2X4" R1547

## (undated) DEVICE — MANIFOLD NEPTUNE 4 PORT 700-20

## (undated) DEVICE — SOL NACL 0.9% IRRIG 3000ML BAG 2B7477

## (undated) DEVICE — GLOVE PROTEXIS POWDER FREE 7.5 ORTHOPEDIC 2D73ET75

## (undated) DEVICE — NDL SPINAL 18GA 3.5" 405184

## (undated) DEVICE — ESU GROUND PAD UNIVERSAL W/O CORD

## (undated) DEVICE — SUCTION IRR SYSTEM W/O TIP INTERPULSE HANDPIECE 0210-100-000

## (undated) DEVICE — DRAPE IOBAN INCISE 23X17" 6650EZ

## (undated) DEVICE — GLOVE PROTEXIS POWDER FREE 8.5 ORTHOPEDIC 2D73ET85

## (undated) DEVICE — CAST PADDING 4" UNSTERILE 9044

## (undated) DEVICE — CAST PADDING 6" STERILE 9046S

## (undated) DEVICE — BONE CLEANING TIP INTERPULSE  0210-010-000

## (undated) DEVICE — GLOVE PROTEXIS MICRO 8.0  2D73PM80

## (undated) DEVICE — GLOVE PROTEXIS BLUE W/NEU-THERA 7.5  2D73EB75

## (undated) DEVICE — LINEN TOWEL PACK X5 5464

## (undated) DEVICE — PACK TOTAL KNEE SOP15TKFSD

## (undated) DEVICE — BLADE SAW SAGITTAL STRK 21X90X1.27MM HD SYS 6 6221-127-090

## (undated) DEVICE — SU STRATAFIX PDS PLUS 0 CT 45CM SXPP1A406

## (undated) DEVICE — BONE CEMENT MIXEVAC III HI VAC KIT  0206-015-000

## (undated) DEVICE — PREP CHLORAPREP 26ML TINTED ORANGE  260815

## (undated) DEVICE — BLADE CLIPPER 4412A

## (undated) DEVICE — DRAPE STERI U 1015

## (undated) DEVICE — DRAPE ARTHROSCOPY 120X92" 9414

## (undated) DEVICE — CAST PADDING 6" UNSTERILE 9046

## (undated) DEVICE — HOOD FLYTE W/PEELAWAY 408-800-100

## (undated) DEVICE — SU PDO 1 STRATAFIX 36X36CM CTX TAPERPOINT SXPD2B405

## (undated) DEVICE — SOL WATER IRRIG 1000ML BOTTLE 2F7114

## (undated) DEVICE — DRSG ADAPTIC 3X8" 6113

## (undated) DEVICE — BLADE SAW SAGITTAL STRK 18X90X1.27MM HD SYS 6 6118-127-090

## (undated) RX ORDER — CELECOXIB 200 MG/1
CAPSULE ORAL
Status: DISPENSED
Start: 2021-12-30

## (undated) RX ORDER — PROPOFOL 10 MG/ML
INJECTION, EMULSION INTRAVENOUS
Status: DISPENSED
Start: 2021-12-30

## (undated) RX ORDER — FENTANYL CITRATE 50 UG/ML
INJECTION, SOLUTION INTRAMUSCULAR; INTRAVENOUS
Status: DISPENSED
Start: 2021-12-30

## (undated) RX ORDER — ACETAMINOPHEN 500 MG
TABLET ORAL
Status: DISPENSED
Start: 2021-12-30

## (undated) RX ORDER — TRANEXAMIC ACID 650 MG/1
TABLET ORAL
Status: DISPENSED
Start: 2021-12-30

## (undated) RX ORDER — CEFAZOLIN SODIUM IN 0.9 % NACL 3 G/100 ML
INTRAVENOUS SOLUTION, PIGGYBACK (ML) INTRAVENOUS
Status: DISPENSED
Start: 2021-12-30

## (undated) RX ORDER — GLYCOPYRROLATE 0.2 MG/ML
INJECTION, SOLUTION INTRAMUSCULAR; INTRAVENOUS
Status: DISPENSED
Start: 2021-12-30

## (undated) RX ORDER — LIDOCAINE HYDROCHLORIDE 20 MG/ML
INJECTION, SOLUTION EPIDURAL; INFILTRATION; INTRACAUDAL; PERINEURAL
Status: DISPENSED
Start: 2021-12-30

## (undated) RX ORDER — ONDANSETRON 2 MG/ML
INJECTION INTRAMUSCULAR; INTRAVENOUS
Status: DISPENSED
Start: 2021-12-30

## (undated) RX ORDER — NEOSTIGMINE METHYLSULFATE 1 MG/ML
VIAL (ML) INJECTION
Status: DISPENSED
Start: 2021-12-30

## (undated) RX ORDER — DEXAMETHASONE SODIUM PHOSPHATE 4 MG/ML
INJECTION, SOLUTION INTRA-ARTICULAR; INTRALESIONAL; INTRAMUSCULAR; INTRAVENOUS; SOFT TISSUE
Status: DISPENSED
Start: 2021-12-30